# Patient Record
Sex: MALE | Race: WHITE | NOT HISPANIC OR LATINO
[De-identification: names, ages, dates, MRNs, and addresses within clinical notes are randomized per-mention and may not be internally consistent; named-entity substitution may affect disease eponyms.]

---

## 2021-10-19 ENCOUNTER — APPOINTMENT (OUTPATIENT)
Dept: ORTHOPEDIC SURGERY | Facility: CLINIC | Age: 26
End: 2021-10-19
Payer: COMMERCIAL

## 2021-10-19 ENCOUNTER — NON-APPOINTMENT (OUTPATIENT)
Age: 26
End: 2021-10-19

## 2021-10-19 PROBLEM — Z00.00 ENCOUNTER FOR PREVENTIVE HEALTH EXAMINATION: Status: ACTIVE | Noted: 2021-10-19

## 2021-10-19 PROCEDURE — 73610 X-RAY EXAM OF ANKLE: CPT | Mod: RT

## 2021-10-19 PROCEDURE — 99204 OFFICE O/P NEW MOD 45 MIN: CPT

## 2021-10-24 NOTE — DISCUSSION/SUMMARY
[de-identified] : The patient presents with an acute inversion injury resulting in a high ankle sprain. I discussed the spectrum of sprain injuries; the majority of which responded well to nonoperative modalities of treatment, which include relative rest over the next 2 weeks, NSAID's, ice, compressive wraps and gradual return to weight bearing activity as tolerated.  CAM boot provided\par \par I would like to reevaluate the patient in 2 weeks to determine whether lace-up ankle bracing is required for return to activity versus formal physical therapy for balance/proprioceptive training if the patient is still struggling with their injury. At this time, radiographs show no evidence of fracture, and I have recommended weightbearing as tolerated. Patient appreciates and agrees with plan. \par \par NILO, Chari Caldwell DO, PGY3, assisted with the history and documentation for Dr. Marie on this date 10/19/2021.\par \par \par Ritu Marie MD, EdM\par Sports Medicine [facility]\par Department of Orthopedics

## 2021-10-24 NOTE — PHYSICAL EXAM
[de-identified] : ANKLE EVAL: \par Constitutional: Well-nourished, well-developed, No acute distress \par Respiratory: Good respiratory effort, no SOB \par Lymphatic: No regional lymphadenopathy, no lymphedema \par Psychiatric: Pleasant and normal affect, alert and oriented x3 \par Skin: Clean dry and intact B/L UE/LE \par Musculoskeletal: normal except where as noted in regional exam \par \par Right Ankle: \par APPEARANCE: + swelling \par POSITIVE TENDERNESS: medial malleolus, lateral malleolus, ATFL, CFL, PTFL, anterior tibiofibular ligament (high ankle), \par NONTENDER:, tibialis posterior tendon, achilles tendon,, 5th metatarsal. \par RANGE OF MOTION: limited by pain in all directions, dorsiflexion>plantarflexion\par SPECIAL TESTS: +anterior drawer\par NEURO: Normal sensation of LE, DTRs 2+/4 patella and achilles \par PULSES: 2+ DP/PT pulses  [de-identified] : \par The following radiographs were ordered and read by me during this patient's visit. I reviewed each radiograph in detail with the patient and discussed the findings as highlighted below. \par \par 3 views of the right ankle were obtained today that show no fracture, or dislocation. There are no degenerative changes seen. There is no malalignment. No obvious osseous abnormality. Otherwise unremarkable.

## 2021-10-24 NOTE — HISTORY OF PRESENT ILLNESS
[de-identified] : HPI:\reynaldo Lomeli is a 26 year M who presents with right ankle pain. Pain is primarily located at the right ankle. Patient was playing doubles tennis. He and his partner went for a ball when he fell on top of his partner and twisted his right ankle. His ankle swelled up and he was unable to weight bear. He went to [facility] ED immediately afterwards, XR of right ankle negative for fracture. He was told to take Motrin. ED ACE wrapped right ankle and gave him crutches to use. Pain is described as sharp in nature, 1010 in intensity, worse with movement or weight bearing, better with rest. No prior injury. Motrin has not been alleviating pain. He has been icing his ankle. \reynaldo Denies bowel/bladder changes, fevers, chills, saddle anesthesia. Denies numbness, tingling, weakness of the lower extremities.

## 2021-10-27 RX ORDER — IBUPROFEN 800 MG/1
800 TABLET, FILM COATED ORAL 3 TIMES DAILY
Qty: 30 | Refills: 0 | Status: ACTIVE | COMMUNITY
Start: 2021-10-27 | End: 1900-01-01

## 2021-11-02 ENCOUNTER — TRANSCRIPTION ENCOUNTER (OUTPATIENT)
Age: 26
End: 2021-11-02

## 2021-11-02 ENCOUNTER — APPOINTMENT (OUTPATIENT)
Dept: ORTHOPEDIC SURGERY | Facility: CLINIC | Age: 26
End: 2021-11-02
Payer: COMMERCIAL

## 2021-11-02 PROCEDURE — 99214 OFFICE O/P EST MOD 30 MIN: CPT

## 2021-12-03 ENCOUNTER — APPOINTMENT (OUTPATIENT)
Dept: ORTHOPEDIC SURGERY | Facility: CLINIC | Age: 26
End: 2021-12-03
Payer: COMMERCIAL

## 2021-12-03 DIAGNOSIS — S93.409A SPRAIN OF UNSPECIFIED LIGAMENT OF UNSPECIFIED ANKLE, INITIAL ENCOUNTER: ICD-10-CM

## 2021-12-03 PROCEDURE — 99214 OFFICE O/P EST MOD 30 MIN: CPT

## 2024-01-05 ENCOUNTER — EMERGENCY (EMERGENCY)
Facility: HOSPITAL | Age: 29
LOS: 1 days | Discharge: LEFT BEFORE DISCHARGE | End: 2024-01-05
Admitting: EMERGENCY MEDICINE
Payer: COMMERCIAL

## 2024-01-05 VITALS
HEART RATE: 65 BPM | WEIGHT: 259.93 LBS | RESPIRATION RATE: 16 BRPM | SYSTOLIC BLOOD PRESSURE: 127 MMHG | DIASTOLIC BLOOD PRESSURE: 72 MMHG | TEMPERATURE: 98 F | OXYGEN SATURATION: 96 % | HEIGHT: 72 IN

## 2024-01-05 PROCEDURE — L9992: CPT

## 2024-01-05 NOTE — ED ADULT TRIAGE NOTE - CHIEF COMPLAINT QUOTE
right eye blurred vision beginning 2000 hrs which has subsided.  went to  prior to ED and developed headache.